# Patient Record
Sex: MALE | Race: WHITE | Employment: STUDENT | URBAN - METROPOLITAN AREA
[De-identification: names, ages, dates, MRNs, and addresses within clinical notes are randomized per-mention and may not be internally consistent; named-entity substitution may affect disease eponyms.]

---
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
## 2022-01-19 ENCOUNTER — NURSE TRIAGE (OUTPATIENT)
Dept: INTERNAL MEDICINE CLINIC | Facility: CLINIC | Age: 22
End: 2022-01-19

## 2022-01-19 NOTE — TELEPHONE ENCOUNTER
Action Requested: Summary for Provider     []  Critical Lab, Recommendations Needed  [] Need Additional Advice  []   FYI    []   Need Orders  [] Need Medications Sent to Pharmacy  []  Other     SUMMARY: reports intermittent left leg pain, mostly upper, for

## 2022-01-22 ENCOUNTER — OFFICE VISIT (OUTPATIENT)
Dept: INTERNAL MEDICINE CLINIC | Facility: CLINIC | Age: 22
End: 2022-01-22
Payer: MEDICAID

## 2022-01-22 ENCOUNTER — PATIENT MESSAGE (OUTPATIENT)
Dept: INTERNAL MEDICINE CLINIC | Facility: CLINIC | Age: 22
End: 2022-01-22

## 2022-01-22 VITALS
SYSTOLIC BLOOD PRESSURE: 127 MMHG | BODY MASS INDEX: 27.66 KG/M2 | DIASTOLIC BLOOD PRESSURE: 66 MMHG | HEART RATE: 80 BPM | WEIGHT: 162 LBS | HEIGHT: 64 IN

## 2022-01-22 DIAGNOSIS — M62.89 QUADRICEP TIGHTNESS: ICD-10-CM

## 2022-01-22 DIAGNOSIS — M79.605 LEFT LEG PAIN: Primary | ICD-10-CM

## 2022-01-22 PROCEDURE — 3074F SYST BP LT 130 MM HG: CPT | Performed by: PHYSICIAN ASSISTANT

## 2022-01-22 PROCEDURE — 3008F BODY MASS INDEX DOCD: CPT | Performed by: PHYSICIAN ASSISTANT

## 2022-01-22 PROCEDURE — 3078F DIAST BP <80 MM HG: CPT | Performed by: PHYSICIAN ASSISTANT

## 2022-01-22 PROCEDURE — 99202 OFFICE O/P NEW SF 15 MIN: CPT | Performed by: PHYSICIAN ASSISTANT

## 2022-01-22 RX ORDER — PREDNISONE 10 MG/1
10 TABLET ORAL 2 TIMES DAILY
Qty: 14 TABLET | Refills: 0 | Status: SHIPPED | OUTPATIENT
Start: 2022-01-22 | End: 2022-01-29

## 2022-01-22 NOTE — PROGRESS NOTES
HPI:    Patient ID: Shayan Edmond is a 25year old male. Patient presents for an evaluation of left leg pain. Notes for the past month he has had intermittent pain in his left quad which has now become more constant.   States that the pain before was is 27.81 kg/m². Physical Exam    Constitutional: He is oriented to person, place, and time and thin. He appears well-developed and well-nourished. HENT:   Head: Normocephalic and atraumatic.    Nose: Nose normal.   Eyes: Pupils are equal, round, and reac

## 2022-01-22 NOTE — TELEPHONE ENCOUNTER
From: Brielle Ledesma PA-C  To: Orin Cunningham  Sent: 1/22/2022 11:34 AM CST  Subject: JOSE Agustin like we will need to go through PT first to get further testing done. Take the meds I gave you and start PT in the next few weeks. We can evaluate from there. If there is an Ortho doc near school that takes your insurance you can see them if the pain persists after meds and PT.    If you have any questions please contact our office  -Brielle Ledesma PA-C

## 2022-02-01 NOTE — TELEPHONE ENCOUNTER
Spoke to patient. He goes to school in [place] and he said that the physical therapy places near him don't take his insurance. He is asking if there is another way to get MRI. Claus Saab, please advise.

## 2022-02-01 NOTE — TELEPHONE ENCOUNTER
A MRI likely will not be covered without trying other treatments first. The patient can also see ortho for an evaluation or start PT when he is back home

## 2022-02-01 NOTE — TELEPHONE ENCOUNTER
I have ordered an MRI of his thigh. He can get this if it goes through and is covered.  Order placed

## 2022-05-05 ENCOUNTER — TELEPHONE (OUTPATIENT)
Dept: PEDIATRICS CLINIC | Facility: CLINIC | Age: 22
End: 2022-05-05

## 2022-05-05 NOTE — TELEPHONE ENCOUNTER
Message to Dr Eduardo Gonsalez for review of scheduling -   Please see below and confirm if okay to schedule a well-exam? Or, have patient connect with adult medicine?     (well-exam with physician 5/17/21)

## 2022-05-19 ENCOUNTER — TELEPHONE (OUTPATIENT)
Dept: INTERNAL MEDICINE CLINIC | Facility: CLINIC | Age: 22
End: 2022-05-19

## 2022-05-19 ENCOUNTER — PATIENT MESSAGE (OUTPATIENT)
Dept: INTERNAL MEDICINE CLINIC | Facility: CLINIC | Age: 22
End: 2022-05-19

## 2022-05-19 NOTE — TELEPHONE ENCOUNTER
From: Zen Olmstead  To: Meenakshi Shaffer PA-C  Sent: 5/19/2022 9:57 AM CDT  Subject: Leg Issue    My leg pain is starting to flare up again ever since Xochilte been home. I was supposed to get the MRI last week but the request got denied. Should I schedule an appointment to get looked at again or what should I do?

## 2022-05-19 NOTE — TELEPHONE ENCOUNTER
Patient was advised in February to get a ortho consult upon return if the MRI was not approved by insurance.

## 2022-05-19 NOTE — TELEPHONE ENCOUNTER
See 1/22/22 Patient Message:    A MRI likely will not be covered without trying other treatments first. The patient can also see ortho for an evaluation or start PT when he is back home

## 2022-06-30 ENCOUNTER — HOSPITAL ENCOUNTER (OUTPATIENT)
Dept: GENERAL RADIOLOGY | Facility: HOSPITAL | Age: 22
Discharge: HOME | End: 2022-06-30
Attending: ORTHOPAEDIC SURGERY
Payer: MEDICAID

## 2022-06-30 ENCOUNTER — OFFICE VISIT (OUTPATIENT)
Dept: ORTHOPEDICS CLINIC | Facility: CLINIC | Age: 22
End: 2022-06-30
Payer: MEDICAID

## 2022-06-30 DIAGNOSIS — R52 PAIN: ICD-10-CM

## 2022-06-30 DIAGNOSIS — Q65.89 HIP DYSPLASIA: Primary | ICD-10-CM

## 2022-06-30 PROCEDURE — 73552 X-RAY EXAM OF FEMUR 2/>: CPT | Performed by: ORTHOPAEDIC SURGERY

## 2022-08-04 ENCOUNTER — OFFICE VISIT (OUTPATIENT)
Dept: PHYSICAL THERAPY | Age: 22
End: 2022-08-04
Attending: ORTHOPAEDIC SURGERY
Payer: MEDICAID

## 2022-08-04 DIAGNOSIS — Q65.89 HIP DYSPLASIA: ICD-10-CM

## 2022-08-04 PROCEDURE — 97110 THERAPEUTIC EXERCISES: CPT

## 2022-08-04 PROCEDURE — 97161 PT EVAL LOW COMPLEX 20 MIN: CPT

## 2022-08-08 ENCOUNTER — OFFICE VISIT (OUTPATIENT)
Dept: PHYSICAL THERAPY | Age: 22
End: 2022-08-08
Attending: ORTHOPAEDIC SURGERY
Payer: MEDICAID

## 2022-08-08 PROCEDURE — 97110 THERAPEUTIC EXERCISES: CPT

## 2022-08-09 ENCOUNTER — APPOINTMENT (OUTPATIENT)
Dept: PHYSICAL THERAPY | Age: 22
End: 2022-08-09
Attending: ORTHOPAEDIC SURGERY
Payer: MEDICAID

## 2022-08-16 ENCOUNTER — OFFICE VISIT (OUTPATIENT)
Dept: PHYSICAL THERAPY | Age: 22
End: 2022-08-16
Attending: ORTHOPAEDIC SURGERY
Payer: MEDICAID

## 2022-08-16 PROCEDURE — 97140 MANUAL THERAPY 1/> REGIONS: CPT

## 2022-08-16 PROCEDURE — 97110 THERAPEUTIC EXERCISES: CPT

## 2022-08-18 ENCOUNTER — APPOINTMENT (OUTPATIENT)
Dept: PHYSICAL THERAPY | Age: 22
End: 2022-08-18
Attending: ORTHOPAEDIC SURGERY
Payer: MEDICAID

## 2023-02-27 ENCOUNTER — PATIENT MESSAGE (OUTPATIENT)
Dept: PEDIATRICS CLINIC | Facility: CLINIC | Age: 23
End: 2023-02-27

## 2023-02-27 NOTE — TELEPHONE ENCOUNTER
Last [facility] 5/17/2021 seen by DMM. Please refer to [employer] message below and adv recommendation. Routing to DMM.

## 2023-05-12 ENCOUNTER — APPOINTMENT (OUTPATIENT)
Dept: GENERAL RADIOLOGY | Age: 23
End: 2023-05-12
Attending: EMERGENCY MEDICINE
Payer: MEDICAID

## 2023-05-12 ENCOUNTER — TELEPHONE (OUTPATIENT)
Dept: ORTHOPEDICS CLINIC | Facility: CLINIC | Age: 23
End: 2023-05-12

## 2023-05-12 ENCOUNTER — HOSPITAL ENCOUNTER (OUTPATIENT)
Age: 23
Discharge: HOME | End: 2023-05-12
Attending: EMERGENCY MEDICINE
Payer: MEDICAID

## 2023-05-12 VITALS
TEMPERATURE: 99 F | HEART RATE: 63 BPM | SYSTOLIC BLOOD PRESSURE: 126 MMHG | OXYGEN SATURATION: 97 % | DIASTOLIC BLOOD PRESSURE: 69 MMHG | RESPIRATION RATE: 19 BRPM

## 2023-05-12 DIAGNOSIS — S93.401A SPRAIN OF RIGHT ANKLE, UNSPECIFIED LIGAMENT, INITIAL ENCOUNTER: Primary | ICD-10-CM

## 2023-05-12 PROCEDURE — 99213 OFFICE O/P EST LOW 20 MIN: CPT

## 2023-05-12 PROCEDURE — 73610 X-RAY EXAM OF ANKLE: CPT | Performed by: EMERGENCY MEDICINE

## 2023-05-12 PROCEDURE — 99204 OFFICE O/P NEW MOD 45 MIN: CPT

## 2023-05-12 NOTE — ED INITIAL ASSESSMENT (HPI)
Patient states that about a month ago he was playing basketball and rolled his right ankle when he was coming down from a jump    Patient initially with lots of pain to the ankle, improved now, but has lingering pain to the ankle with certain movements like jumping and running

## 2023-05-12 NOTE — TELEPHONE ENCOUNTER
Patient reports right rolled ankle playing basketball aprox 5 weeks ago has been managing using OTC treatment has returned to basketball and is still experiencing pain. Advised patient next available with any ortho is with Dr. Donn Dyson is 5/23/23 patient declined. Advised patient he can call [phone] [facility] to request a sooner appt if possible. Patient verbalized understanding had no further questions.

## 2023-06-01 ENCOUNTER — OFFICE VISIT (OUTPATIENT)
Dept: ORTHOPEDICS CLINIC | Facility: CLINIC | Age: 23
End: 2023-06-01

## 2023-06-01 DIAGNOSIS — S93.491A SPRAIN OF ANTERIOR TALOFIBULAR LIGAMENT OF RIGHT ANKLE, INITIAL ENCOUNTER: Primary | ICD-10-CM

## 2023-06-01 PROCEDURE — 99244 OFF/OP CNSLTJ NEW/EST MOD 40: CPT | Performed by: ORTHOPAEDIC SURGERY

## 2023-06-14 ENCOUNTER — PATIENT MESSAGE (OUTPATIENT)
Dept: PEDIATRICS CLINIC | Facility: CLINIC | Age: 23
End: 2023-06-14

## 2023-06-15 ENCOUNTER — TELEPHONE (OUTPATIENT)
Dept: PEDIATRICS CLINIC | Facility: CLINIC | Age: 23
End: 2023-06-15

## 2023-06-15 NOTE — TELEPHONE ENCOUNTER
Routed to DMM    Please advise IM provider/cardiologist patient should consider establishing care with. Patient aware if symptoms arise should seek medical attention right away. Ok to send [employer] back with update.

## 2023-06-15 NOTE — TELEPHONE ENCOUNTER
Pt is 22 sent mychart message to DMM to see if he would give him a referral to see a Cardiologist. Pt has been having chest & shoulder pains for about 12 mos. Pt said does not have a PCP. Pt adv needs to establish care with [facility]. Pt is aware, but does have apt for Sat.

## 2023-06-16 ENCOUNTER — EKG ENCOUNTER (OUTPATIENT)
Dept: LAB | Age: 23
End: 2023-06-16
Payer: MEDICAID

## 2023-06-16 ENCOUNTER — LAB ENCOUNTER (OUTPATIENT)
Dept: LAB | Age: 23
End: 2023-06-16
Payer: MEDICAID

## 2023-06-16 ENCOUNTER — OFFICE VISIT (OUTPATIENT)
Dept: INTERNAL MEDICINE CLINIC | Facility: CLINIC | Age: 23
End: 2023-06-16

## 2023-06-16 VITALS
BODY MASS INDEX: 26.63 KG/M2 | OXYGEN SATURATION: 96 % | HEART RATE: 62 BPM | DIASTOLIC BLOOD PRESSURE: 55 MMHG | WEIGHT: 156 LBS | HEIGHT: 64 IN | SYSTOLIC BLOOD PRESSURE: 114 MMHG

## 2023-06-16 DIAGNOSIS — R07.9 CHEST PAIN, UNSPECIFIED TYPE: ICD-10-CM

## 2023-06-16 DIAGNOSIS — Z00.00 ROUTINE GENERAL MEDICAL EXAMINATION AT A HEALTH CARE FACILITY: ICD-10-CM

## 2023-06-16 DIAGNOSIS — R07.9 CHEST PAIN, UNSPECIFIED TYPE: Primary | ICD-10-CM

## 2023-06-16 LAB
ALBUMIN SERPL-MCNC: 4.2 G/DL (ref 3.4–5)
ALBUMIN/GLOB SERPL: 1.1 {RATIO} (ref 1–2)
ALP LIVER SERPL-CCNC: 81 U/L
ALT SERPL-CCNC: 26 U/L
ANION GAP SERPL CALC-SCNC: 8 MMOL/L (ref 0–18)
AST SERPL-CCNC: 20 U/L (ref 15–37)
BILIRUB SERPL-MCNC: 0.4 MG/DL (ref 0.1–2)
BUN BLD-MCNC: 18 MG/DL (ref 7–18)
BUN/CREAT SERPL: 17 (ref 10–20)
CALCIUM BLD-MCNC: 9.7 MG/DL (ref 8.5–10.1)
CHLORIDE SERPL-SCNC: 103 MMOL/L (ref 98–112)
CHOLEST SERPL-MCNC: 121 MG/DL (ref ?–200)
CO2 SERPL-SCNC: 27 MMOL/L (ref 21–32)
CREAT BLD-MCNC: 1.06 MG/DL
DEPRECATED RDW RBC AUTO: 39.8 FL (ref 35.1–46.3)
ERYTHROCYTE [DISTWIDTH] IN BLOOD BY AUTOMATED COUNT: 12.2 % (ref 11–15)
FASTING PATIENT LIPID ANSWER: NO
FASTING STATUS PATIENT QL REPORTED: NO
GFR SERPLBLD BASED ON 1.73 SQ M-ARVRAT: 103 ML/MIN/1.73M2 (ref 60–?)
GLOBULIN PLAS-MCNC: 3.7 G/DL (ref 2.8–4.4)
GLUCOSE BLD-MCNC: 84 MG/DL (ref 70–99)
HCT VFR BLD AUTO: 43.6 %
HDLC SERPL-MCNC: 51 MG/DL (ref 40–59)
HGB BLD-MCNC: 14.7 G/DL
LDLC SERPL CALC-MCNC: 57 MG/DL (ref ?–100)
MCH RBC QN AUTO: 30 PG (ref 26–34)
MCHC RBC AUTO-ENTMCNC: 33.7 G/DL (ref 31–37)
MCV RBC AUTO: 89 FL
NONHDLC SERPL-MCNC: 70 MG/DL (ref ?–130)
OSMOLALITY SERPL CALC.SUM OF ELEC: 287 MOSM/KG (ref 275–295)
PLATELET # BLD AUTO: 259 10(3)UL (ref 150–450)
POTASSIUM SERPL-SCNC: 4 MMOL/L (ref 3.5–5.1)
PROT SERPL-MCNC: 7.9 G/DL (ref 6.4–8.2)
RBC # BLD AUTO: 4.9 X10(6)UL
SODIUM SERPL-SCNC: 138 MMOL/L (ref 136–145)
TRIGL SERPL-MCNC: 57 MG/DL (ref 30–149)
TSI SER-ACNC: 1.84 MIU/ML (ref 0.36–3.74)
VLDLC SERPL CALC-MCNC: 8 MG/DL (ref 0–30)
WBC # BLD AUTO: 7.1 X10(3) UL (ref 4–11)

## 2023-06-16 PROCEDURE — 85027 COMPLETE CBC AUTOMATED: CPT

## 2023-06-16 PROCEDURE — 93005 ELECTROCARDIOGRAM TRACING: CPT

## 2023-06-16 PROCEDURE — 36415 COLL VENOUS BLD VENIPUNCTURE: CPT

## 2023-06-16 PROCEDURE — 80061 LIPID PANEL: CPT

## 2023-06-16 PROCEDURE — 80053 COMPREHEN METABOLIC PANEL: CPT

## 2023-06-16 PROCEDURE — 93010 ELECTROCARDIOGRAM REPORT: CPT | Performed by: INTERNAL MEDICINE

## 2023-06-16 PROCEDURE — 84443 ASSAY THYROID STIM HORMONE: CPT

## 2023-06-16 RX ORDER — SODIUM FLUORIDE 5 MG/G
1 CREAM DENTAL AS DIRECTED
COMMUNITY
Start: 2023-01-24

## 2023-06-19 LAB
ATRIAL RATE: 56 BPM
P AXIS: 75 DEGREES
P-R INTERVAL: 154 MS
Q-T INTERVAL: 424 MS
QRS DURATION: 98 MS
QTC CALCULATION (BEZET): 409 MS
R AXIS: 81 DEGREES
T AXIS: 55 DEGREES
VENTRICULAR RATE: 56 BPM

## 2023-07-08 ENCOUNTER — TELEPHONE (OUTPATIENT)
Dept: FAMILY MEDICINE CLINIC | Facility: CLINIC | Age: 23
End: 2023-07-08

## 2023-07-28 ENCOUNTER — PATIENT MESSAGE (OUTPATIENT)
Dept: INTERNAL MEDICINE CLINIC | Facility: CLINIC | Age: 23
End: 2023-07-28

## 2023-07-31 ENCOUNTER — OFFICE VISIT (OUTPATIENT)
Dept: DERMATOLOGY CLINIC | Facility: CLINIC | Age: 23
End: 2023-07-31

## 2023-07-31 DIAGNOSIS — L21.9 SEBORRHEIC DERMATITIS: Primary | ICD-10-CM

## 2023-07-31 DIAGNOSIS — L72.0 MILIA: ICD-10-CM

## 2023-07-31 RX ORDER — KETOCONAZOLE 20 MG/ML
SHAMPOO TOPICAL
Qty: 120 ML | Refills: 3 | Status: SHIPPED | OUTPATIENT
Start: 2023-07-31

## 2023-07-31 RX ORDER — LEVOCETIRIZINE DIHYDROCHLORIDE 2.5 MG/5ML
SOLUTION ORAL
COMMUNITY

## 2023-07-31 NOTE — PROGRESS NOTES
HPI:    Patient ID: Ana Maria Millan is a 23year old male. Patient presents with a concern of an unhealing acne like lesion to the face for the past 1.5 months. Patient doesn't usually get acne and hasn't tried any treatments at home. He also has dandruff to the scalp for the past few months. Has scalp dryness. Currently using biolage and used head and shoulders in the past. No draining or tenderness noted. No allergies to medications noted. Review of Systems   Constitutional:  Negative for chills and fever. Musculoskeletal:  Negative for arthralgias and myalgias. Skin:  Positive for rash. Negative for color change and wound. Current Outpatient Medications   Medication Sig Dispense Refill    ketoconazole 2 % External Shampoo Apply to scalp 2 times per week. 120 mL 3    Levocetirizine Dihydrochloride 2.5 MG/5ML Oral Solution  (Patient not taking: Reported on 7/31/2023)      SODIUM FLUORIDE 5000 PLUS 1.1 % Dental Cream Take 1 Bottle by mouth As Directed. Allergies:  Seasonal                UNKNOWN    Comment:Runny nose   There were no vitals taken for this visit. There is no height or weight on file to calculate BMI. PHYSICAL EXAM:   Physical Exam  Constitutional:       General: He is not in acute distress. Appearance: Normal appearance. Skin:     General: Skin is warm and dry. Findings: Rash present. Comments: Scaling noted throughout the scalp. No draining or tenderness noted. No erythema noted. Small milia noted above the left eyelid. No draining or tenderness noted. About 2-3 mm in size. Neurological:      Mental Status: He is alert and oriented to person, place, and time. ASSESSMENT/PLAN:   1.  Seborrheic dermatitis  -After discussion with patient, advised the following:  -Start ketoconazole shampoo  -Educated to apply 2 times per day   -To call or follow-up with worsening symptoms or concerns.   -Pt was agreeable to plan and will comply with discussion above. 2. Milia  -Start retin A  -Educated to apply nightly  -Will take a few months to improve.   -Pt was agreeable to plan and will comply with discussion above. No orders of the defined types were placed in this encounter. Meds This Visit:  Requested Prescriptions     Signed Prescriptions Disp Refills    ketoconazole 2 % External Shampoo 120 mL 3     Sig: Apply to scalp 2 times per week.        Imaging & Referrals:  None         TA#[id number]

## 2023-08-01 ENCOUNTER — NURSE TRIAGE (OUTPATIENT)
Dept: INTERNAL MEDICINE CLINIC | Facility: CLINIC | Age: 23
End: 2023-08-01

## 2023-08-01 DIAGNOSIS — R07.9 CHEST PAIN OF UNCERTAIN ETIOLOGY: Primary | ICD-10-CM

## 2023-08-02 RX ORDER — OMEPRAZOLE 40 MG/1
40 CAPSULE, DELAYED RELEASE ORAL DAILY
Qty: 60 CAPSULE | Refills: 0 | Status: SHIPPED | OUTPATIENT
Start: 2023-08-02 | End: 2023-10-01

## 2023-08-08 NOTE — TELEPHONE ENCOUNTER
Action Requested: Summary for Provider     []  Critical Lab, Recommendations Needed  [] Need Additional Advice  []   FYI    []   Need Orders  [] Need Medications Sent to Pharmacy  []  Other     SUMMARY: Patient saw Melina VILLEDA 2023 for chest pain. Labs and EKG were OK. Since then he continues to have brief episodes of chest pain/pressuremost often at night. Does not seem to be triggered by exertion or food items. Last night he woke up with discomfort for a couple of minutes. Thought maybe was having heartburn but did not have lakeshia burning sensation. Lasted a coupe of minutes. He is asking if he should be referred to cardiologist?  Melina VILLEDA please advise? Offered appointment today or tomorrow but he is downtown and declined due to work. Advised if pain or pressure worsens, to go to emergency department downtown. Reason for call: Acute intermittent chest pain  Onset: several months    Spoke with patient, GUY verified. As summarized above. Not having any pain at time of call today.     Reason for Disposition   All other patients with chest pain (Exception: fleeting chest pain lasting a few seconds)    Protocols used: Chest Pain-A-OH
I attempted to call patient with no answer. Please notify him I will order the referral to cardiology. His symptoms may be due to acid reflux even though he does not have the sensation of heartburn. Other symptoms of reflux can include a sour taste in the mouth, pain that improves with position change, trouble swallowing or frequent throat clearing. Please have him make sure he does not eat anything at least 2-3 hours before bedtime. We can also start an acid reducer called omeprazole (generic Prilosec) which I have ordered but he can also get over the counter. He should take 40mg once daily for 4-6 weeks and see if there is any improvement. He should also avoid acidic food and drinks, including coffee, citrus, tomatoes, chocolate, and spicy foods. If his symptoms resolve with these changes he can always cancel the cardiology appointment.  Thank you
Patient read Where message on 8/02/2023.
Pt contacted. Verified name and . Pt informed of Kamryn's message. Pt verbalized understanding. Pt requesting to send information including Kamryn's recommendations to [employer].
English

## 2023-08-12 ENCOUNTER — PATIENT MESSAGE (OUTPATIENT)
Dept: DERMATOLOGY CLINIC | Facility: CLINIC | Age: 23
End: 2023-08-12

## 2023-12-18 ENCOUNTER — OFFICE VISIT (OUTPATIENT)
Dept: INTERNAL MEDICINE CLINIC | Facility: CLINIC | Age: 23
End: 2023-12-18

## 2023-12-18 VITALS
BODY MASS INDEX: 26.87 KG/M2 | DIASTOLIC BLOOD PRESSURE: 74 MMHG | WEIGHT: 157.38 LBS | SYSTOLIC BLOOD PRESSURE: 120 MMHG | HEART RATE: 75 BPM | HEIGHT: 64 IN

## 2023-12-18 DIAGNOSIS — Z00.00 ANNUAL PHYSICAL EXAM: Primary | ICD-10-CM

## 2023-12-18 DIAGNOSIS — F42.9 OBSESSIVE-COMPULSIVE DISORDER, UNSPECIFIED TYPE: ICD-10-CM

## 2023-12-18 PROCEDURE — 3074F SYST BP LT 130 MM HG: CPT | Performed by: INTERNAL MEDICINE

## 2023-12-18 PROCEDURE — 3008F BODY MASS INDEX DOCD: CPT | Performed by: INTERNAL MEDICINE

## 2023-12-18 PROCEDURE — 3078F DIAST BP <80 MM HG: CPT | Performed by: INTERNAL MEDICINE

## 2023-12-18 PROCEDURE — 99395 PREV VISIT EST AGE 18-39: CPT | Performed by: INTERNAL MEDICINE

## 2023-12-18 NOTE — PATIENT INSTRUCTIONS
Your physical today was normal.  I would recommend a COVID booster at your pharmacy. Continue healthy diet and regular exercise. The Behavioral Health navigator will contact you regarding counseling for OCD symptoms.

## 2023-12-28 ENCOUNTER — PATIENT MESSAGE (OUTPATIENT)
Dept: DERMATOLOGY CLINIC | Facility: CLINIC | Age: 23
End: 2023-12-28

## 2023-12-28 NOTE — TELEPHONE ENCOUNTER
Griffin,    Please see patient message. Refer to [facility]? Please advise, TY!

## 2023-12-29 NOTE — TELEPHONE ENCOUNTER
Sent tretinoin to the pharmacy. He may apply small amounts to the areas at night. Avoid groin and areas of the face. Upper thighs is fine.

## 2023-12-29 NOTE — TELEPHONE ENCOUNTER
Patient may need to be seen for office visit to see what he can do for him if they are directly on the groin.

## 2023-12-29 NOTE — TELEPHONE ENCOUNTER
Can he send a photo of the scarring so I can see what it looks like? Otherwise he can come in for me to discuss with him.

## 2023-12-29 NOTE — TELEPHONE ENCOUNTER
If he has not used prescriptions strength retinol we can try that to see if that would help.  Otherwise we can try a bleaching cream.

## 2024-01-09 ENCOUNTER — OFFICE VISIT (OUTPATIENT)
Dept: DERMATOLOGY CLINIC | Facility: CLINIC | Age: 24
End: 2024-01-09
Payer: MEDICAID

## 2024-01-09 DIAGNOSIS — L73.9 FOLLICULITIS: Primary | ICD-10-CM

## 2024-01-09 PROCEDURE — 99213 OFFICE O/P EST LOW 20 MIN: CPT | Performed by: PHYSICIAN ASSISTANT

## 2024-01-09 RX ORDER — CLINDAMYCIN PHOSPHATE 10 UG/ML
1 LOTION TOPICAL DAILY
Qty: 60 ML | Refills: 3 | Status: SHIPPED | OUTPATIENT
Start: 2024-01-09

## 2024-01-09 NOTE — PROGRESS NOTES
HPI:    Patient ID: Royal Diaz is a 23 year old male.    Patient presents with boils in his groin area for the past 2-3 months. Started after doing more intense workouts. Patient states the boils are painful and has drainage. No draining or tenderness noted. No allergies to medications noted.     Review of Systems   Constitutional:  Negative for chills and fever.   Musculoskeletal:  Negative for arthralgias and myalgias.   Skin:  Positive for rash. Negative for color change and wound.            Current Outpatient Medications   Medication Sig Dispense Refill   • clindamycin 1 % External Lotion Apply 1 Application topically daily. 60 mL 3   • ketoconazole 2 % External Shampoo Apply to scalp 2 times per week. 120 mL 3   • tretinoin 0.025 % External Cream Apply 1 Application topically nightly. Use as tolerated (Patient not taking: Reported on 1/9/2024) 30 g 3     Allergies:  Allergies   Allergen Reactions   • Seasonal UNKNOWN     Runny nose      There were no vitals taken for this visit.  There is no height or weight on file to calculate BMI.  PHYSICAL EXAM:   Physical Exam  Constitutional:       General: He is not in acute distress.     Appearance: Normal appearance.   Skin:     General: Skin is warm and dry.      Findings: Rash present.      Comments: Folliculitis noted in the groin area. No drianign or tenderness noted. Erythematous papules and macules noted.    Neurological:      Mental Status: He is alert and oriented to person, place, and time.              ASSESSMENT/PLAN:   1. Folliculitis  -After discussion with patient, advised the following:  -Started clindamycin  -Educated to apply 2 times per day as needed on the affected areas.   -Use warm compresses as well  -To call or follow-up with worsening symptoms or concerns.   -Pt was agreeable to plan and will comply with discussion above.         No orders of the defined types were placed in this encounter.      Meds This Visit:  Requested  Prescriptions     Signed Prescriptions Disp Refills   • clindamycin 1 % External Lotion 60 mL 3     Sig: Apply 1 Application topically daily.       Imaging & Referrals:  None         ID#[id number]

## 2024-03-19 ENCOUNTER — PATIENT MESSAGE (OUTPATIENT)
Dept: INTERNAL MEDICINE CLINIC | Facility: CLINIC | Age: 24
End: 2024-03-19

## 2024-03-20 NOTE — TELEPHONE ENCOUNTER
From: Royal Diaz  To: Dinesh Bean  Sent: 3/19/2024 5:35 PM CDT  Subject: Nocturia    Good evening,    I had a question regarding a problem that I have had at night. I can never sleep through the night, and I always wake up 1-2 times to urinate. I drink a lot of water during the day, but I stop drinking 2 hours before bed. Is this normal or a concern that I need to address further? Please let me know. Thank you!    Otto Diaz

## 2024-05-17 ENCOUNTER — LAB ENCOUNTER (OUTPATIENT)
Dept: LAB | Age: 24
End: 2024-05-17
Attending: INTERNAL MEDICINE

## 2024-05-17 ENCOUNTER — OFFICE VISIT (OUTPATIENT)
Dept: INTERNAL MEDICINE CLINIC | Facility: CLINIC | Age: 24
End: 2024-05-17

## 2024-05-17 VITALS
WEIGHT: 154.38 LBS | SYSTOLIC BLOOD PRESSURE: 112 MMHG | HEIGHT: 64 IN | HEART RATE: 61 BPM | BODY MASS INDEX: 26.36 KG/M2 | DIASTOLIC BLOOD PRESSURE: 61 MMHG

## 2024-05-17 DIAGNOSIS — K59.00 CONSTIPATION, UNSPECIFIED CONSTIPATION TYPE: Primary | ICD-10-CM

## 2024-05-17 LAB
ALBUMIN SERPL-MCNC: 4.7 G/DL (ref 3.2–4.8)
ALBUMIN/GLOB SERPL: 1.5 {RATIO} (ref 1–2)
ALP LIVER SERPL-CCNC: 85 U/L
ALT SERPL-CCNC: 27 U/L
ANION GAP SERPL CALC-SCNC: 4 MMOL/L (ref 0–18)
AST SERPL-CCNC: 25 U/L (ref ?–34)
BILIRUB SERPL-MCNC: 0.4 MG/DL (ref 0.3–1.2)
BUN BLD-MCNC: 17 MG/DL (ref 9–23)
BUN/CREAT SERPL: 16.8 (ref 10–20)
CALCIUM BLD-MCNC: 9.7 MG/DL (ref 8.7–10.4)
CHLORIDE SERPL-SCNC: 107 MMOL/L (ref 98–112)
CO2 SERPL-SCNC: 29 MMOL/L (ref 21–32)
CREAT BLD-MCNC: 1.01 MG/DL
DEPRECATED RDW RBC AUTO: 41.5 FL (ref 35.1–46.3)
EGFRCR SERPLBLD CKD-EPI 2021: 109 ML/MIN/1.73M2 (ref 60–?)
ERYTHROCYTE [DISTWIDTH] IN BLOOD BY AUTOMATED COUNT: 12.6 % (ref 11–15)
FASTING STATUS PATIENT QL REPORTED: YES
GLOBULIN PLAS-MCNC: 3.1 G/DL (ref 2–3.5)
GLUCOSE BLD-MCNC: 88 MG/DL (ref 70–99)
HCT VFR BLD AUTO: 44 %
HGB BLD-MCNC: 14.2 G/DL
MCH RBC QN AUTO: 29.2 PG (ref 26–34)
MCHC RBC AUTO-ENTMCNC: 32.3 G/DL (ref 31–37)
MCV RBC AUTO: 90.5 FL
OSMOLALITY SERPL CALC.SUM OF ELEC: 291 MOSM/KG (ref 275–295)
PLATELET # BLD AUTO: 227 10(3)UL (ref 150–450)
POTASSIUM SERPL-SCNC: 4.9 MMOL/L (ref 3.5–5.1)
PROT SERPL-MCNC: 7.8 G/DL (ref 5.7–8.2)
RBC # BLD AUTO: 4.86 X10(6)UL
SODIUM SERPL-SCNC: 140 MMOL/L (ref 136–145)
TSI SER-ACNC: 1.42 MIU/ML (ref 0.55–4.78)
WBC # BLD AUTO: 7.5 X10(3) UL (ref 4–11)

## 2024-05-17 PROCEDURE — 99213 OFFICE O/P EST LOW 20 MIN: CPT | Performed by: INTERNAL MEDICINE

## 2024-05-17 PROCEDURE — 84443 ASSAY THYROID STIM HORMONE: CPT | Performed by: INTERNAL MEDICINE

## 2024-05-17 PROCEDURE — 85027 COMPLETE CBC AUTOMATED: CPT | Performed by: INTERNAL MEDICINE

## 2024-05-17 PROCEDURE — 80053 COMPREHEN METABOLIC PANEL: CPT | Performed by: INTERNAL MEDICINE

## 2024-05-17 PROCEDURE — 3074F SYST BP LT 130 MM HG: CPT | Performed by: INTERNAL MEDICINE

## 2024-05-17 PROCEDURE — 3078F DIAST BP <80 MM HG: CPT | Performed by: INTERNAL MEDICINE

## 2024-05-17 PROCEDURE — 36415 COLL VENOUS BLD VENIPUNCTURE: CPT | Performed by: INTERNAL MEDICINE

## 2024-05-17 PROCEDURE — 3008F BODY MASS INDEX DOCD: CPT | Performed by: INTERNAL MEDICINE

## 2024-05-17 RX ORDER — AMOXICILLIN 500 MG/1
500 TABLET, FILM COATED ORAL EVERY 8 HOURS
COMMUNITY
Start: 2024-05-14

## 2024-05-17 NOTE — PATIENT INSTRUCTIONS
Await results of today's blood tests  Continue regular exercise and adequate hydration  Use MiraLAX 17 g daily and call if no better

## 2024-05-17 NOTE — PROGRESS NOTES
Royal Diaz is a 23 year old male.   Chief Complaint   Patient presents with    Constipation     HPI:   Royal presents this morning for evaluation of intermittent constipation.    He has had intermittent constipation for approximately 1 year.  Initially episodes were occurring every few months, but now he has had more persistent problems with constipation.  He describes excessive straining to have a bowel movement, and stools are often small and hard.  Currently he is having a stool approximately every second day, although he sometimes can have several hard stools in a given day.  On occasion he has noticed bright red blood on the tissue when wiping after a bowel movement when straining, but no blood in the stool or toilet water.  He has been trying to increase his fiber intake, and drinks water and fluids throughout the day.  He also exercises 6 days weekly.  No associated fever.  Appetite normal.  Weight stable.  Some abdominal bloating.  No heartburn dysphagia nausea vomiting or abdominal pain.  No diarrhea.  He wonders whether he may have IBS.  He did use MiraLAX for 3 days with significant improvement but then stopped MiraLAX.    Medications reviewed, as listed below.  He had his wisdom teeth removed 3 days ago and is on amoxicillin after his procedure.  No medication allergies.  Current Outpatient Medications   Medication Sig Dispense Refill    amoxicillin 500 MG Oral Tab Take 1 tablet (500 mg total) by mouth every 8 (eight) hours.      clindamycin 1 % External Lotion Apply 1 Application topically daily. 60 mL 3    tretinoin 0.025 % External Cream Apply 1 Application topically nightly. Use as tolerated 30 g 3    ketoconazole 2 % External Shampoo Apply to scalp 2 times per week. 120 mL 3     Allergies   Allergen Reactions    Seasonal UNKNOWN     Runny nose      History reviewed. No pertinent past medical history.  Past Surgical History:   Procedure Laterality Date    Skin tissue procedure unlisted       as a child    [place] teeth removed  05/14/2024      Social History:  Social History     Socioeconomic History    Marital status: Single   Tobacco Use    Smoking status: Never    Smokeless tobacco: Never   Substance and Sexual Activity    Alcohol use: Yes     Alcohol/week: 8.0 standard drinks of alcohol     Types: 8 Standard drinks or equivalent per week     Comment: 8 drinks one day weekly    Drug use: Never    Sexual activity: Yes     Birth control/protection: Condom   Other Topics Concern    Second-hand smoke exposure No    Alcohol/drug concerns No    Violence concerns No    Grew up on a farm No    History of tanning Yes    Outdoor occupation No    Reaction to local anesthetic No    Pt has a pacemaker No    Pt has a defibrillator No        EXAM:   GENERAL: Pleasant male appearing well in no distress  /61 (BP Location: Left arm, Patient Position: Sitting, Cuff Size: adult)   Pulse 61   Ht 5' 4\" (1.626 m)   Wt 154 lb 6.4 oz (70 kg)   BMI 26.50 kg/m²   HEENT: Anicteric, conjunctiva pink, oropharynx normal  NECK: Supple without mass or lymphadenopathy  LUNGS: Resonant to percussion and clear to auscultation  CARDIAC: Rhythm regular S1 S2 normal without murmur  ABDOMEN: Not distended.  Bowel sounds normal.  Soft nontender without guarding or rebound and without mass or hepatosplenomegaly      ASSESSMENT AND PLAN:   1. Constipation, unspecified constipation type  Intermittent, recently worse  Check CMP CBC and TSH with reflex T4 today.  Order sent  Reinforced adequate hydration and regular physical activity as he is doing  Recommend MiraLAX 17 g daily  - Comp Metabolic Panel (14)  - CBC, Platelet; No Differential  - TSH W Reflex To Free T4      The patient indicates understanding of these issues and agrees to the plan.  The patient is asked to return as needed.    Dinesh Bean MD  5/17/2024  11:47 AM

## 2024-06-26 NOTE — TELEPHONE ENCOUNTER
Refill Request for medication(s): ketoconazole 2 % External Shampoo     Last Office Visit: 01/09/24    Last Refill: 07/31/23    Pharmacy, Dosage verified:   [id number] - [place], [place] - [address] AT [facility], [phone], [phone]     Condition Update (if applicable):   Mauricio msg sent to pt    Rx pended and sent to provider for approval, please advise. Thank You!

## 2024-06-27 RX ORDER — KETOCONAZOLE 20 MG/ML
SHAMPOO TOPICAL
Qty: 120 ML | Refills: 3 | Status: SHIPPED | OUTPATIENT
Start: 2024-06-27

## 2025-01-09 ENCOUNTER — OFFICE VISIT (OUTPATIENT)
Dept: DERMATOLOGY CLINIC | Facility: CLINIC | Age: 25
End: 2025-01-09

## 2025-01-09 DIAGNOSIS — L73.9 FOLLICULITIS: ICD-10-CM

## 2025-01-09 DIAGNOSIS — L30.9 DERMATITIS: Primary | ICD-10-CM

## 2025-01-09 PROCEDURE — 99213 OFFICE O/P EST LOW 20 MIN: CPT | Performed by: PHYSICIAN ASSISTANT

## 2025-01-09 RX ORDER — TRIAMCINOLONE ACETONIDE 1 MG/G
1 OINTMENT TOPICAL 2 TIMES DAILY
Qty: 30 G | Refills: 0 | Status: SHIPPED | OUTPATIENT
Start: 2025-01-09

## 2025-01-09 RX ORDER — CLINDAMYCIN PHOSPHATE 10 UG/ML
1 LOTION TOPICAL DAILY
Qty: 60 ML | Refills: 3 | Status: SHIPPED | OUTPATIENT
Start: 2025-01-09

## 2025-01-09 NOTE — PROGRESS NOTES
HPI:    Patient ID: Royal Diaz is a 24 year old male.    Patient presents for a lesion on the neck that is red, and painful. No draining or tenderness noted. No allergies to medications noted. Has noted it started after an ingrown hair. Has note improved. Has not used anything for this. Some itching at times. Has noted that he will still get the         Review of Systems   Constitutional:  Negative for chills and fever.   Musculoskeletal:  Negative for arthralgias and myalgias.   Skin:  Positive for rash. Negative for color change and wound.            Current Outpatient Medications   Medication Sig Dispense Refill    clindamycin 1 % External Lotion Apply 1 Application topically daily. 60 mL 3    triamcinolone 0.1 % External Ointment Apply 1 Application topically 2 (two) times daily. 30 g 0    ketoconazole 2 % External Shampoo Apply to scalp 2 times per week. 120 mL 3    amoxicillin 500 MG Oral Tab Take 1 tablet (500 mg total) by mouth every 8 (eight) hours. (Patient not taking: Reported on 1/9/2025)      clindamycin 1 % External Lotion Apply 1 Application topically daily. (Patient not taking: Reported on 1/9/2025) 60 mL 3    tretinoin 0.025 % External Cream Apply 1 Application topically nightly. Use as tolerated (Patient not taking: Reported on 1/9/2025) 30 g 3     Allergies:Allergies[1]   There were no vitals taken for this visit.  There is no height or weight on file to calculate BMI.  PHYSICAL EXAM:   Physical Exam  Constitutional:       General: He is not in acute distress.     Appearance: Normal appearance.   Skin:     General: Skin is warm and dry.      Findings: Rash present.      Comments: Erythematous scaling areas noted on the both sides of the neck. No draining or tenderness noted.    Neurological:      Mental Status: He is alert and oriented to person, place, and time.                ASSESSMENT/PLAN:   1. Dermatitis  -After discussion with patient, advised the following:  -Start  triamcinolone  -Educated to apply 2 times per day for the next 1-2 weeks.   -Avoid shaving until it heals.   -To call or follow-up with worsening symptoms or concerns  -Patient was agreeable to plan and will comply with discussion above.       2. Folliculitis  -After discussion with patient, advised the following:  -Continue with clindamycin as needed   -To call or follow-up with worsening symptoms or concerns  -Patient was agreeable to plan and will comply with discussion above.         No orders of the defined types were placed in this encounter.      Meds This Visit:  Requested Prescriptions     Signed Prescriptions Disp Refills    clindamycin 1 % External Lotion 60 mL 3     Sig: Apply 1 Application topically daily.    triamcinolone 0.1 % External Ointment 30 g 0     Sig: Apply 1 Application topically 2 (two) times daily.       Imaging & Referrals:  None         ID#[id number]       [1]   Allergies  Allergen Reactions    Seasonal UNKNOWN     Runny nose

## (undated) DIAGNOSIS — M79.605 LEFT LEG PAIN: Primary | ICD-10-CM

## (undated) DIAGNOSIS — M76.899 QUADRICEPS TENDINITIS: ICD-10-CM

## (undated) NOTE — LETTER
Name:  Shawn Yu Year:  10th Grade Class: Student ID No.:   Address:  [address] [place] [address],[place] [address] Phone:  [phone] (home)  : 523/2000 15year old   Name Relationship [address] Work Phone Home Phone Mobile Phone   1.  Pamela Jim 12. Has anyone in your family had unexplained fainting, seizures, or near drowning? BONE AND JOINT QUESTIONS Yes No   17. Have you ever had an injury to a bone, muscle, ligament, or tendon that caused you to miss a practice or a game?      18. Have you /fall?     36. Have you ever become ill while exercising in the heat?     41. Do you get frequent muscle cramps when exercising? 42. Do you or someone in your family have sickle cell trait or disease? 43.  Have you ever had any problems with your ey Genitourinary (males only)* Yes    Skin:  HSV, lesions suggestive of MRSA, tinea corporis Yes    Neurologic* Yes    MUSCULOSKELETAL     Neck Yes    Back Yes    Shoulder/arm Yes    Elbow/forearm Yes    Wrist/hand/fingers Yes    Hip/thigh Yes    Knee Yes state series events or during the school day, and I/our student do/does hereby agree to submit to such testing and analysis by a certified laboratory.  We further understand and agree that the results of the performance-enhancing substance testing may be pr

## (undated) NOTE — LETTER
6/2/2021              Iman Diaz        [address] [facility] [address]         Immunization History   Administered Date(s) Administered   • >=3 YRS FLUZONE OR FLUARIX QUAD PRESERVE FREE SINGLE DOSE (46927) FLU CLINIC 10/21/2015

## (undated) NOTE — Clinical Note
Name:  Naomi Seo Year:  9th Grade Class: Student ID No.:   Address:  [address] [place] [address],[facility] [address] Phone:  [phone] (home)  : 523/2000 17year old   Name Relationship [address] Work Phone Home Phone Mobile Phone   1.  Tila Youssef 12. Has anyone in your family had unexplained fainting, seizures, or near drowning? BONE AND JOINT QUESTIONS Yes No   17. Have you ever had an injury to a bone, muscle, ligament, or tendon that caused you to miss a practice or a game?      18. Have you /fall?     36. Have you ever become ill while exercising in the heat?     41. Do you get frequent muscle cramps when exercising? 42. Do you or someone in your family have sickle cell trait or disease? 43.  Have you ever had any problems with your ey Abdomen Yes    Genitourinary (males only)* {N/A:2593}    Skin:  HSV, lesions suggestive of MRSA, tinea corporis Yes    Neurologic* Yes    MUSCULOSKELETAL     Neck Yes    Back Yes    Shoulder/arm Yes    Elbow/forearm Yes    Wrist/hand/fingers Yes    Hip/thi laboratory.  We further understand and agree that the results of the performance-enhancing substance testing may be provided to certain individuals in my/our student’s high school as specified in the [facility] Performance-Enhancing Substance Testing Program Prot

## (undated) NOTE — MR AVS SNAPSHOT
Joao  [address]  [phone]               Thank you for choosing us for your health care visit with [facility], OD.   We are glad to serve you and happy to provide you with this summary of Visit [facility] online at  [email or web address]

## (undated) NOTE — LETTER
[facility] [employer] of Child Health Examination       Student's Name  Jluis Syed verifying above immunization history must sign below.   Signature                                                                                                                                   Title                           Date  04/19/2019   Signature Student's Name  Brayan Bush Birth Date  9/23/2000  Sex  Male School   Grade Level/ID#  10th Grade   HEALTH HISTORY          TO BE COMPLETED AND SIGNED BY PARENT/GUARDIAN AND VERIFIED BY HEALTH CARE PROVIDER    ALLERGIES  (Food, drug, insect, other PHYSICAL EXAMINATION REQUIREMENTS (head circumference if <33 years old):   /80 (BP Location: Right arm, Patient Position: Sitting, Cuff Size: adult)   Pulse 67   Ht 5' 4.5\" (1.638 m)   Wt 69.4 kg (153 lb)   BMI 25.86 kg/m²     DIABETES SCREENING  B Mouth/Dental Yes  Spinal examination Yes    Cardiovascular/HTN Yes  Nutritional status Yes    Respiratory Yes                   Diagnosis of Asthma: No Mental Health Yes        Currently Prescribed Asthma Medication:            Quick-relief  medication (e.

## (undated) NOTE — LETTER
VACCINE ADMINISTRATION RECORD  PARENT / GUARDIAN APPROVAL  Date: 2021  Vaccine administered to:  Cristiana Henson     : 2000    MRN: [id number]    A copy of the appropriate Centers for Disease Control and Prevention Vaccine Information state

## (undated) NOTE — LETTER
[facility] [employer] Child Health Examination       Student's Name  Melinda Tesfaye verifying above immunization history must sign below.   Signature                 Title                           Date     Signature HEALTH HISTORY          TO BE COMPLETED AND SIGNED BY PARENT/GUARDIAN AND VERIFIED BY HEALTH CARE PROVIDER    ALLERGIES  (Food, drug, insect, other)  Patient has no known allergies.  MEDICATION  (List all prescribed or taken on a regular basis.)     Diagnos /75 (BP Location: Right arm, Patient Position: Sitting, Cuff Size: child)   Pulse 77   Ht 5' 4\" (1.626 m)   Wt 71.2 kg (157 lb)   BMI 26.95 kg/m²     DIABETES SCREENING  BMI>85% age/sex  No And any two of the following:  Family History No    Ethnic Respiratory Yes                   Diagnosis of Asthma: No Mental Health Yes        Currently Prescribed Asthma Medication:            Quick-relief  medication (e.g. Short Acting Beta Antagonist): No          Controller medication (e.g. inhaled corticostero

## (undated) NOTE — LETTER
VACCINE ADMINISTRATION RECORD  PARENT / GUARDIAN APPROVAL  Date: 2021  Vaccine administered to:  Jasper Card     : 2000    MRN: [id number]    A copy of the appropriate Centers for Disease Control and Prevention Vaccine Information state

## (undated) NOTE — LETTER
6/3/2021              Yina Hutchinson                                                                                : 2000        [facility] [address]         To whom it may concern,    Please exempt Madhu Barnett from need

## (undated) NOTE — LETTER
[facility] [employer] Child Health Examination       Student's Name  Louis Mariee must sign below.   Signature                                                                                                                                   Title DO                          Date 5/17/2021      Signature Birth Date  9/23/2000  Sex  Male School   Grade Level/ID#  [address] SIGNED BY PARENT/GUARDIAN AND VERIFIED BY HEALTH CARE PROVIDER    ALLERGIES  (Food, drug, insect, other)  Patient has no known allergies.  MEDIC ____Plate    ____Other  Other concerns? Ear/Hearing problems? Yes   No  Information may be shared with appropriate personnel for health /educational purposes. Bone/Joint problem/injury/scoliosis?    Yes   No  Parent/Guardian Signature Normal Comments/Follow-up/Needs  Normal Comments/Follow-up/Needs   Skin Yes  Endocrine Yes    Ears Yes                      Screen result: Gastrointestinal Yes    Eyes Yes     Screen result:   Genito-Urinary Yes  LMP   Nose Yes  Neurological Yes    Throat [facility] [place] [address]  [phone]   Rev 11/15                                                                    Printed by the [employer]

## (undated) NOTE — MR AVS SNAPSHOT
[address] [facility]  [phone]               Thank you for choosing us for your health care visit with Sima Bermudez DO.   We are glad to serve you and happy to provide you with this sum healthy? Make sure to talk to your teen about who his or her friends are and how they spend time together. Peer pressure can be a problem among teenagers. · Life at home. How is your child’s behavior? Does he or she get along with others in the family?  Is how to spend free time. You can’t always have the final say, but you can encourage healthy habits. Your teen should:  · Get at least 30 minutes to 60 minutes of physical activity every day. This time can be broken up throughout the day.  After-school sports · Let the health care provider know if you or your teen have questions about hygiene or acne. · Bring your teen to the dentist at least twice a year for teeth cleaning and a checkup. · Remind your teen to brush and floss his or her teeth before bed.   Sle driving. Teach your teen to always wear a seat belt, drive the speed limit, and follow the rules of the road. Do not allow your teenager to text or talk on a cell phone while driving. (And don’t do this yourself!  Remember, you set an example.)  · Set rules eased. Offer your love and support.  If your teen talks about death or suicide, seek help right away.      Next checkup at: _______________________________     PARENT NOTES:  Date Last Reviewed: 10/2/2014  © 6743-9491 [employer] 36-47 lbs               7.5 ml                       3                              1&1/2  48-59 lbs               10 ml                        4                              2                       1  60-71 lbs               12.5 ml                     5 4               2 tablets    Safety and Anticipatory Guidance  It is important that teenagers receive adequate amounts of sleep-at least 9 hours of uninterrupted sleep is recommended. Continue to encourage them to make smart decisions [employer] to develop moral ideals and to select role models. If you have any concerns about your teen's development, check with your healthcare provider. Developed by [employer]. Published by [employer].   Last modified: 2010-07-28  Last reviewed: 2009-0 Healthy nutrition starts as early as infancy with breastfeeding. Once your baby begins eating solid foods, introduce nutritious foods early on and often. Sometimes toddlers need to try a food 10 times before they actually accept and enjoy it.  It is also im

## (undated) NOTE — Clinical Note
[facility] [employer] Child Health Examination       Student's Name  Debra Whitehead Title                           Date    (If adding dates to the above immunization history section, put your initials by date(s) and sign here.)   ALTERNATIVE PROOF OF IMMUNITY   1 Diagnosis of asthma? Child wakes during the night coughing   Yes       No    Yes       No    Loss of function of one of paired organs? (eye/ear/kidney/testicle)   Yes       No      Birth Defects? Developmental delay?    Yes       No    Yes       No  Hospi Family History     no               Ethnic Minority   no                          Signs of Insulin Resistance (hypertension, dyslipidemia, polycystic ovarian syndrome, acanthosis nigricans)       no                    At Risk   no   Lead Risk Questionnaire Controller medication (e.g. inhaled corticosteroid):   No Other   NEEDS/MODIFICATIONS required in the school setting  None DIETARY Needs/Restrictions     None   SPECIAL INSTRUCTIONS/DEVICES e.g. safety glasses, glass eye, chest protector for arrhyt

## (undated) NOTE — LETTER
8/12/2021              Eri Corner        [address] [facility] [address]     To Whom It May Concern,      Immunization History   Administered Date(s) Administered   • >=3 YRS FLUZONE OR FLUARIX QUAD PRESERVE FREE SINGLE DOSE (90

## (undated) NOTE — LETTER
VACCINE ADMINISTRATION RECORD  PARENT / GUARDIAN APPROVAL  Date: 2021  Vaccine administered to:  Justo Fields     : 2000    MRN: [id number]    A copy of the appropriate Centers for Disease Control and Prevention Vaccine Information statem

## (undated) NOTE — LETTER
[facility] [employer] Child Health Examination       Student's Name  Yon Friend professional) verifying above immunization history must sign below.   Signature                                                   Title                           Date  5/17/21   Signature School   Grade Level/ID#  College   HEALTH HISTORY          TO BE COMPLETED AND SIGNED BY PARENT/GUARDIAN AND VERIFIED BY HEALTH CARE PROVIDER    ALLERGIES  (Food, drug, insect, other)  Patient has no known allergies.  MEDICATION  (List all prescribed or ta concerns? Ear/Hearing problems? Yes   No  Information may be shared with appropriate personnel for health /educational purposes. Bone/Joint problem/injury/scoliosis?    Yes   No  Parent/Guardian Signature                                          Zane Endocrine Yes    Ears Yes                      Screen result: Gastrointestinal Yes    Eyes Yes     Screen result:   Genito-Urinary Yes  LMP   Nose Yes  Neurological Yes    Throat Yes  Musculoskeletal Yes    Mouth/Dental Yes  Spinal examination Yes    Card by the [employer]

## (undated) NOTE — LETTER
VACCINE ADMINISTRATION RECORD  PARENT / GUARDIAN APPROVAL  Date: 2020  Vaccine administered to:  Fortino Damon     : 2000    MRN: [id number]    A copy of the appropriate Centers for Disease Control and Prevention Vaccine Information state

## (undated) NOTE — LETTER
Name:  Trell Neighbours Year:   Class: Student ID No.:   Address:  [address] [place] [address],[place] [address] Phone:  [phone] (home)  : 523/2000 18year old   Name Relationship [address] Work Phone Home Phone Mobile Phone   1.  Toy DELACRUZ 15. Does anyone in your family have hypertrophic cardiomyopathy, Marfan syndrome, arrhythmogenic right ventricular cardiomyopathy, long QT syndrome, short QT syndrome, Brugada syndrome, or catecholaminergic polymorphic ventricular tachycardia?      15. Does 35. Have you ever had a hit or blow to the head that caused confusion, prolonged headache, or memory problems? 36. Do you have a history of seizure disorder?     37. Do you have headaches with exercise?      38. Have you ever had numbness, tingling, or Vision: R 20/    L 20/   Corrected:   Yes/No   MEDICAL NORMAL ABNORMAL FINDINGS   Appearance:  Marfan stigmata (kyphoscoliosis, high-arched palate, pectus excavatum,      arachnodactyly, arm span > height, hyperlaxity, myopia, MVP, aortic insufficiency) Nathalia Rodriguez (This section for high school students only)   0115-0908 school term     As a prerequisite to participation in [employer] athletic activities, we agree that I/our student will not use performance-enhancing substances as defined in the [employer] Performance-Enhancing